# Patient Record
Sex: FEMALE | Race: WHITE | NOT HISPANIC OR LATINO | URBAN - METROPOLITAN AREA
[De-identification: names, ages, dates, MRNs, and addresses within clinical notes are randomized per-mention and may not be internally consistent; named-entity substitution may affect disease eponyms.]

---

## 2020-07-21 ENCOUNTER — OFFICE VISIT (OUTPATIENT)
Dept: URBAN - METROPOLITAN AREA CLINIC 60 | Facility: CLINIC | Age: 85
End: 2020-07-21

## 2020-10-19 ENCOUNTER — OFFICE VISIT (OUTPATIENT)
Dept: URBAN - METROPOLITAN AREA CLINIC 60 | Facility: CLINIC | Age: 85
End: 2020-10-19

## 2020-11-06 ENCOUNTER — OFFICE VISIT (OUTPATIENT)
Dept: URBAN - METROPOLITAN AREA CLINIC 63 | Facility: CLINIC | Age: 85
End: 2020-11-06

## 2020-12-10 ENCOUNTER — OFFICE VISIT (OUTPATIENT)
Dept: URBAN - METROPOLITAN AREA CLINIC 60 | Facility: CLINIC | Age: 85
End: 2020-12-10

## 2021-05-25 ENCOUNTER — OFFICE VISIT (OUTPATIENT)
Dept: URBAN - METROPOLITAN AREA CLINIC 60 | Facility: CLINIC | Age: 86
End: 2021-05-25

## 2021-06-15 ENCOUNTER — OFFICE VISIT (OUTPATIENT)
Dept: URBAN - METROPOLITAN AREA CLINIC 60 | Facility: CLINIC | Age: 86
End: 2021-06-15

## 2021-06-29 ENCOUNTER — OFFICE VISIT (OUTPATIENT)
Dept: URBAN - METROPOLITAN AREA CLINIC 60 | Facility: CLINIC | Age: 86
End: 2021-06-29

## 2021-07-21 ENCOUNTER — OFFICE VISIT (OUTPATIENT)
Dept: URBAN - METROPOLITAN AREA CLINIC 60 | Facility: CLINIC | Age: 86
End: 2021-07-21

## 2021-08-10 ENCOUNTER — OFFICE VISIT (OUTPATIENT)
Dept: URBAN - METROPOLITAN AREA CLINIC 60 | Facility: CLINIC | Age: 86
End: 2021-08-10

## 2021-08-19 ENCOUNTER — OFFICE VISIT (OUTPATIENT)
Dept: URBAN - METROPOLITAN AREA CLINIC 60 | Facility: CLINIC | Age: 86
End: 2021-08-19

## 2021-09-21 ENCOUNTER — OFFICE VISIT (OUTPATIENT)
Dept: URBAN - METROPOLITAN AREA CLINIC 60 | Facility: CLINIC | Age: 86
End: 2021-09-21

## 2021-10-12 ENCOUNTER — OFFICE VISIT (OUTPATIENT)
Dept: URBAN - METROPOLITAN AREA CLINIC 121 | Facility: CLINIC | Age: 86
End: 2021-10-12

## 2021-10-18 ENCOUNTER — OFFICE VISIT (OUTPATIENT)
Dept: URBAN - METROPOLITAN AREA CLINIC 63 | Facility: CLINIC | Age: 86
End: 2021-10-18

## 2021-12-03 ENCOUNTER — OFFICE VISIT (OUTPATIENT)
Dept: URBAN - METROPOLITAN AREA CLINIC 60 | Facility: CLINIC | Age: 86
End: 2021-12-03

## 2022-01-27 ENCOUNTER — OFFICE VISIT (OUTPATIENT)
Dept: URBAN - METROPOLITAN AREA CLINIC 60 | Facility: CLINIC | Age: 87
End: 2022-01-27

## 2022-02-24 ENCOUNTER — OFFICE VISIT (OUTPATIENT)
Dept: URBAN - METROPOLITAN AREA CLINIC 60 | Facility: CLINIC | Age: 87
End: 2022-02-24

## 2022-06-24 ENCOUNTER — OFFICE VISIT (OUTPATIENT)
Dept: URBAN - METROPOLITAN AREA CLINIC 60 | Facility: CLINIC | Age: 87
End: 2022-06-24

## 2022-07-09 ENCOUNTER — TELEPHONE ENCOUNTER (OUTPATIENT)
Dept: URBAN - METROPOLITAN AREA CLINIC 121 | Facility: CLINIC | Age: 87
End: 2022-07-09

## 2022-07-09 RX ORDER — AMLODIPINE BESYLATE 5 MG/1
TABLET ORAL
Refills: 0 | OUTPATIENT
Start: 2017-07-11 | End: 2017-10-16

## 2022-07-09 RX ORDER — LEVOTHYROXINE SODIUM 25 UG/1
TABLET ORAL
Refills: 0 | OUTPATIENT
Start: 2017-12-04 | End: 2020-01-20

## 2022-07-09 RX ORDER — AMLODIPINE BESYLATE 5 MG/1
TABLET ORAL
Refills: 0 | OUTPATIENT
Start: 2017-12-04 | End: 2020-01-20

## 2022-07-09 RX ORDER — ALUMINUM ZIRCONIUM TETRACHLOROHYDREX GLY 0.18 G/G
STICK TOPICAL
Refills: 0 | OUTPATIENT
Start: 2017-07-11 | End: 2017-10-16

## 2022-07-09 RX ORDER — ALUMINUM ZIRCONIUM TETRACHLOROHYDREX GLY 0.18 G/G
STICK TOPICAL
Refills: 0 | OUTPATIENT
Start: 2017-12-04 | End: 2020-01-20

## 2022-07-09 RX ORDER — LEVOTHYROXINE SODIUM 25 UG/1
TABLET ORAL
Refills: 0 | OUTPATIENT
Start: 2017-10-16 | End: 2017-12-04

## 2022-07-09 RX ORDER — OMEGA-3/DHA/EPA/FISH OIL 300-1000MG
CAPSULE ORAL
Refills: 0 | OUTPATIENT
Start: 2017-07-11 | End: 2017-10-16

## 2022-07-09 RX ORDER — AMLODIPINE BESYLATE 5 MG/1
TABLET ORAL
Refills: 0 | OUTPATIENT
Start: 2017-10-16 | End: 2017-12-04

## 2022-07-09 RX ORDER — LEVOTHYROXINE SODIUM 25 UG/1
TABLET ORAL
Refills: 0 | OUTPATIENT
Start: 2020-01-20 | End: 2020-02-20

## 2022-07-09 RX ORDER — ALUMINUM ZIRCONIUM TETRACHLOROHYDREX GLY 0.18 G/G
STICK TOPICAL
Refills: 0 | OUTPATIENT
Start: 2017-10-16 | End: 2017-12-04

## 2022-07-09 RX ORDER — AMLODIPINE BESYLATE 5 MG/1
TABLET ORAL
Refills: 0 | OUTPATIENT
Start: 2020-01-20 | End: 2020-02-20

## 2022-07-09 RX ORDER — ALUMINUM ZIRCONIUM TETRACHLOROHYDREX GLY 0.18 G/G
STICK TOPICAL
Refills: 0 | OUTPATIENT
Start: 2020-02-20 | End: 2021-07-21

## 2022-07-09 RX ORDER — LEVOTHYROXINE SODIUM 25 UG/1
TABLET ORAL
Refills: 0 | OUTPATIENT
Start: 2017-07-11 | End: 2017-10-16

## 2022-07-09 RX ORDER — OMEGA-3/DHA/EPA/FISH OIL 300-1000MG
CAPSULE ORAL
Refills: 0 | OUTPATIENT
Start: 2017-12-04 | End: 2020-01-20

## 2022-07-09 RX ORDER — OMEGA-3/DHA/EPA/FISH OIL 300-1000MG
CAPSULE ORAL
Refills: 0 | OUTPATIENT
Start: 2017-10-16 | End: 2017-12-04

## 2022-07-09 RX ORDER — ALUMINUM ZIRCONIUM TETRACHLOROHYDREX GLY 0.18 G/G
STICK TOPICAL
Refills: 0 | OUTPATIENT
Start: 2020-01-20 | End: 2020-02-20

## 2022-07-10 ENCOUNTER — TELEPHONE ENCOUNTER (OUTPATIENT)
Dept: URBAN - METROPOLITAN AREA CLINIC 121 | Facility: CLINIC | Age: 87
End: 2022-07-10

## 2022-07-10 RX ORDER — LEVOTHYROXINE SODIUM 25 UG/1
TABLET ORAL
Refills: 0 | Status: ACTIVE | COMMUNITY
Start: 2020-02-20

## 2022-07-10 RX ORDER — AMLODIPINE BESYLATE 5 MG/1
TABLET ORAL
Refills: 0 | Status: ACTIVE | COMMUNITY
Start: 2020-02-20

## 2022-08-05 ENCOUNTER — LAB OUTSIDE AN ENCOUNTER (OUTPATIENT)
Dept: URBAN - METROPOLITAN AREA CLINIC 60 | Facility: CLINIC | Age: 87
End: 2022-08-05

## 2022-08-05 ENCOUNTER — CLAIMS CREATED FROM THE CLAIM WINDOW (OUTPATIENT)
Dept: URBAN - METROPOLITAN AREA CLINIC 60 | Facility: CLINIC | Age: 87
End: 2022-08-05
Payer: MEDICARE

## 2022-08-05 ENCOUNTER — WEB ENCOUNTER (OUTPATIENT)
Dept: URBAN - METROPOLITAN AREA CLINIC 60 | Facility: CLINIC | Age: 87
End: 2022-08-05

## 2022-08-05 VITALS
WEIGHT: 92 LBS | HEIGHT: 64 IN | OXYGEN SATURATION: 97 % | DIASTOLIC BLOOD PRESSURE: 80 MMHG | TEMPERATURE: 96.2 F | HEART RATE: 67 BPM | SYSTOLIC BLOOD PRESSURE: 128 MMHG | RESPIRATION RATE: 20 BRPM | BODY MASS INDEX: 15.71 KG/M2

## 2022-08-05 DIAGNOSIS — K86.2 PANCREATIC CYST: ICD-10-CM

## 2022-08-05 PROCEDURE — 99443 PHONE E/M BY PHYS 21-30 MIN: CPT | Performed by: NURSE PRACTITIONER

## 2022-08-05 RX ORDER — AMLODIPINE BESYLATE 5 MG/1
TABLET ORAL
Refills: 0 | Status: ACTIVE | COMMUNITY
Start: 2020-02-20

## 2022-08-05 RX ORDER — LEVOTHYROXINE SODIUM 25 UG/1
TABLET ORAL
Refills: 0 | Status: ACTIVE | COMMUNITY
Start: 2020-02-20

## 2022-08-05 NOTE — HPI-TODAY'S VISIT:
12/17 Patient colonoscopy with evidence of two tubular adenoma, internal hemorrhoids and diverticular disease. She said she continues with changes on her bowel habit, diarrhea alternating with constipation. Patient said IBgard work good for her, will resume treatment with IB huma as need it. 1/20 Patient here today in good general state, she has no complaints today.  She said she went to the hospital with the complaint of one event of dark stool.  She had a CT scan abdomen pelvis done that shows a pancreatic 8 mm none enhancing cystic structure that seems to be a small benign pancreatic cyst. Patient denies any  symptoms of gastritis, hematemesis, rectal bleeding, or melena.  Last colonoscopy done 2 years ago with evidence of 2 small tubular adenoma polyps and internal hemorrhoids. Patient has medical history of IBS diarrhea, that alternate with constipation.  Occult blood in stool done on December 23/19 was negative.Plan: Patient will have stool studies, repeat occult blood in stool. Will will do MRCP in 6 months to follow-up pancreatic cyst. We will, may do EGD upon studies results. 2/20 Patient here today  in good general state, patient denies any GI symptoms today, she had no more diarrhea, she said have good appetite good energy will around the house going gardening.  Stool studies are negative. Plan: I will see her in 5 months for MRCP  follow-up of her pancreatic cyst.  Patient will come back or call office if symptoms recur.  7/21 Patient here today in good general state.  He is here referred by her primary doctor  due to weight loss.  Patient had lost 7 pounds (3.18 kg) from February 2020  to today. Patient denies any weakness, lack of appetite, abdominal pain, nausea, vomiting, diarrhea, constipation, or sign of GI bleeding. Patient has done several studies due to her pancreatic cyst. Her last MRCP with and without contrast done on April of this year 3 months ago, shows evidence of a no change from previous studies a 0.8 bile duct dilatation and a stable 1 cm pancreatic cyst. Plan: We will do laboratories below and I see her in a month. 1/22 Patient laboratories shows evidence of alpha-fetoprotein 8.9 which is a slightly elevated/cut up of less than 6.1.  Normal lipase and amylase.  Normal CBC, CA 19 9, and CEA normal liver enzymes, bilirubin, alkaline phosphatase, normal kidney function, blood sugar and electrolytes. Patient here today in good general state she said has left lower quadrant abdominal pain around a month ago the pain lasted several hours, was not accompanied by any GI symptoms.  She had 2 events of the same pain a week apart.  Denies any fever, nausea, vomiting, constipation, diarrhea, any sign of GI bleeding.  Last colonoscopy was done 4 years ago it shows evidence of 2 small benign polyps, diverticular disease of the entire colon, and internal hemorrhoids. Today she has not GI complaints. Plan: We will do ultrasound abdomen pelvis. Will repeat AFP in her next OV.  8/22 Patient here today in good general state, she has not GI complaints today.  She did not increase or lost weight. She needs to increase calories intake. Denies poor appetite or any GI symptoms. Will do pancreas work up.

## 2022-08-23 ENCOUNTER — LAB OUTSIDE AN ENCOUNTER (OUTPATIENT)
Dept: URBAN - METROPOLITAN AREA CLINIC 64 | Facility: CLINIC | Age: 87
End: 2022-08-23

## 2022-08-23 ENCOUNTER — TELEPHONE ENCOUNTER (OUTPATIENT)
Dept: URBAN - METROPOLITAN AREA CLINIC 64 | Facility: CLINIC | Age: 87
End: 2022-08-23

## 2022-08-23 ENCOUNTER — TELEPHONE ENCOUNTER (OUTPATIENT)
Dept: URBAN - METROPOLITAN AREA CLINIC 63 | Facility: CLINIC | Age: 87
End: 2022-08-23

## 2022-08-23 PROBLEM — 43797002: Status: ACTIVE | Noted: 2022-08-23

## 2022-08-23 PROBLEM — 1082431000119108: Status: ACTIVE | Noted: 2022-08-23

## 2022-08-26 ENCOUNTER — TELEPHONE ENCOUNTER (OUTPATIENT)
Dept: URBAN - METROPOLITAN AREA CLINIC 63 | Facility: CLINIC | Age: 87
End: 2022-08-26

## 2022-10-04 ENCOUNTER — OFFICE VISIT (OUTPATIENT)
Dept: URBAN - METROPOLITAN AREA CLINIC 60 | Facility: CLINIC | Age: 87
End: 2022-10-04

## 2022-11-01 ENCOUNTER — TELEPHONE ENCOUNTER (OUTPATIENT)
Dept: URBAN - METROPOLITAN AREA CLINIC 63 | Facility: CLINIC | Age: 87
End: 2022-11-01

## 2022-11-07 ENCOUNTER — TELEPHONE ENCOUNTER (OUTPATIENT)
Dept: URBAN - METROPOLITAN AREA CLINIC 63 | Facility: CLINIC | Age: 87
End: 2022-11-07

## 2022-11-09 ENCOUNTER — OFFICE VISIT (OUTPATIENT)
Dept: URBAN - METROPOLITAN AREA CLINIC 60 | Facility: CLINIC | Age: 87
End: 2022-11-09

## 2022-11-09 RX ORDER — LEVOTHYROXINE SODIUM 25 UG/1
TABLET ORAL
Refills: 0 | Status: ACTIVE | COMMUNITY
Start: 2020-02-20

## 2022-11-09 RX ORDER — AMLODIPINE BESYLATE 5 MG/1
TABLET ORAL
Refills: 0 | Status: ACTIVE | COMMUNITY
Start: 2020-02-20

## 2023-06-19 ENCOUNTER — DASHBOARD ENCOUNTERS (OUTPATIENT)
Age: 88
End: 2023-06-19

## 2023-06-29 ENCOUNTER — OFFICE VISIT (OUTPATIENT)
Dept: URBAN - METROPOLITAN AREA CLINIC 60 | Facility: CLINIC | Age: 88
End: 2023-06-29

## 2023-06-29 RX ORDER — LEVOTHYROXINE SODIUM 25 UG/1
TABLET ORAL
Refills: 0 | Status: ACTIVE | COMMUNITY
Start: 2020-02-20

## 2023-06-29 RX ORDER — AMLODIPINE BESYLATE 5 MG/1
TABLET ORAL
Refills: 0 | Status: ACTIVE | COMMUNITY
Start: 2020-02-20

## 2023-07-25 ENCOUNTER — OFFICE VISIT (OUTPATIENT)
Dept: URBAN - METROPOLITAN AREA CLINIC 60 | Facility: CLINIC | Age: 88
End: 2023-07-25